# Patient Record
Sex: FEMALE | Race: WHITE | NOT HISPANIC OR LATINO | URBAN - METROPOLITAN AREA
[De-identification: names, ages, dates, MRNs, and addresses within clinical notes are randomized per-mention and may not be internally consistent; named-entity substitution may affect disease eponyms.]

---

## 2023-04-14 DIAGNOSIS — Z12.4 SCREENING FOR CERVICAL CANCER: Primary | ICD-10-CM

## 2024-05-10 ENCOUNTER — OFFICE VISIT (OUTPATIENT)
Dept: OBSTETRICS AND GYNECOLOGY | Facility: CLINIC | Age: 59
End: 2024-05-10
Payer: COMMERCIAL

## 2024-05-10 VITALS
SYSTOLIC BLOOD PRESSURE: 148 MMHG | WEIGHT: 158 LBS | DIASTOLIC BLOOD PRESSURE: 87 MMHG | HEIGHT: 63 IN | BODY MASS INDEX: 28 KG/M2

## 2024-05-10 DIAGNOSIS — Z01.419 ENCOUNTER FOR WELL WOMAN EXAM WITH ROUTINE GYNECOLOGICAL EXAM: Primary | ICD-10-CM

## 2024-05-10 DIAGNOSIS — N95.1 MENOPAUSAL STATE: ICD-10-CM

## 2024-05-10 DIAGNOSIS — Z12.31 BREAST CANCER SCREENING BY MAMMOGRAM: ICD-10-CM

## 2024-05-10 PROCEDURE — 3077F SYST BP >= 140 MM HG: CPT | Mod: CPTII,S$GLB,, | Performed by: OBSTETRICS & GYNECOLOGY

## 2024-05-10 PROCEDURE — 1159F MED LIST DOCD IN RCRD: CPT | Mod: CPTII,S$GLB,, | Performed by: OBSTETRICS & GYNECOLOGY

## 2024-05-10 PROCEDURE — 99459 PELVIC EXAMINATION: CPT | Mod: S$GLB,,, | Performed by: OBSTETRICS & GYNECOLOGY

## 2024-05-10 PROCEDURE — 99386 PREV VISIT NEW AGE 40-64: CPT | Mod: S$GLB,,, | Performed by: OBSTETRICS & GYNECOLOGY

## 2024-05-10 PROCEDURE — 3079F DIAST BP 80-89 MM HG: CPT | Mod: CPTII,S$GLB,, | Performed by: OBSTETRICS & GYNECOLOGY

## 2024-05-10 PROCEDURE — 3008F BODY MASS INDEX DOCD: CPT | Mod: CPTII,S$GLB,, | Performed by: OBSTETRICS & GYNECOLOGY

## 2024-05-10 RX ORDER — LEVOTHYROXINE SODIUM 100 UG/1
TABLET ORAL
COMMUNITY
Start: 2005-02-01

## 2024-05-10 NOTE — PROGRESS NOTES
"CC:  WELL WOMAN (menopausal since )  Patient Care Team:  Austin Gann MD as PCP - General (Internal Medicine)  Darrell Pascal MD (Gastroenterology)    NEW PATIENT       HPI:  Patient is a 58 y.o. who presents for her well woman exam today.  History reviewed with patient.   Patient is without complaints or concerns today.     HRT:  never used systemic hrt  HX ABNL PAPS: leep     REVIEW OF PRIOR DATA/ HEALTH MAINTENANCE:  LAST ANNUAL:    yrs ago with Deya Howard     LAST MMG-  2+yrs   LAST COLONOSCOPY- 2019- by Dr Pascal - q 10 yrs (neg)   LAST DEXA- never     Past Medical History:   Diagnosis Date    Diabetes mellitus     Diverticulitis     Hypothyroidism, unspecified     Metastatic carcinoma     metastatic papillary carcinoma of thyroid-surg then radioactive iodine as she had 8/12 +nodes    Type 2 diabetes mellitus without complications     meds made her feel sick so not on any. Fasting this am was 128    Vaginal tear resulting from childbirth     4th degree     SURGICAL HX:   has a past surgical history that includes Thyroidectomy (); Cholecystectomy (); and Cervical biopsy w/ loop electrode excision ().    SOCIAL HX:    reports that she has never smoked. She has never used smokeless tobacco. She reports that she does not currently use alcohol. She reports that she does not use drugs.    Current Outpatient Medications   Medication Instructions    SYNTHROID 100 mcg tablet        VITALS:  Blood pressure (!) 148/87, height 5' 3" (1.6 m), weight 71.7 kg (158 lb).  Body mass index is 27.99 kg/m².     PHYSICAL EXAM-  APPEARANCE: Well appearing, in no acute distress.   CV/PULM: No resp distress, normal resp effort   PSYCH:  Normal mood and affect, cooperative   SKIN: No rashes, lesions, or abnormal bruising   ABD: Soft, without tenderness or masses.   BREAST: deferred/declined  PELVIC:  VULVA: Normal female genitalia. No lesions.   URETHRAL MEATUS: No masses, no significant " prolapse.   BLADDER/ URETHRA: No masses or suprapubic tenderness   VAGINA/ CVX: No lesions. +atrophic changes. No discharge   UTERUS:  mobile, non-tender   ADNEXA: No masses, tenderness, or fullness   ANUS/ PERINEUM: Normal tone.  No lesions.           *patient verbally consented for exam and female chaperone present for entire exam     ASSESSMENT and PLAN:  Encounter for well woman exam with routine gynecological exam  -     Liquid-based pap smear, screening    Breast cancer screening by mammogram  -     Mammo Digital Screening Bilat w/ Alex; Future; Expected date: 05/24/2024    Menopausal state       FOLLOWUP:  1 year for wwe or sooner prn    COUNSELING:  Patient was counseled today on recommendation for yearly pelvic exam, current Pap guidelines, self breast exams, annual screening mammograms, routine screening colonoscopy , and bone density testing.  Discussed vitamin D and calcium supplements as well as weight bearing exercise to decrease risks. Encouraged patient to see her PCP for other health maintenance.

## 2024-05-15 LAB — Lab: NORMAL

## 2024-05-21 DIAGNOSIS — R92.8 ABNORMAL FINDING ON MAMMOGRAPHY: Primary | ICD-10-CM

## 2024-05-21 NOTE — PROGRESS NOTES
Please let pt know that radiology is requesting some additional views of both breasts in order to further evaluate some areas of assymetry. Sometimes these are just shadows but need to be looked at in another direction to see that. Im placing orders and efaxing so she can call and set that up

## 2024-06-18 ENCOUNTER — TELEPHONE (OUTPATIENT)
Dept: OBSTETRICS AND GYNECOLOGY | Facility: CLINIC | Age: 59
End: 2024-06-18
Payer: COMMERCIAL

## 2024-06-18 NOTE — TELEPHONE ENCOUNTER
Please check with pt and see when she is getting her addtl views of both breasts from her abnl screening mmg in may

## 2024-06-22 ENCOUNTER — PATIENT MESSAGE (OUTPATIENT)
Dept: OBSTETRICS AND GYNECOLOGY | Facility: CLINIC | Age: 59
End: 2024-06-22
Payer: COMMERCIAL

## 2024-07-05 ENCOUNTER — DOCUMENTATION ONLY (OUTPATIENT)
Dept: OBSTETRICS AND GYNECOLOGY | Facility: CLINIC | Age: 59
End: 2024-07-05
Payer: COMMERCIAL

## 2024-08-22 ENCOUNTER — TELEPHONE (OUTPATIENT)
Dept: OBSTETRICS AND GYNECOLOGY | Facility: CLINIC | Age: 59
End: 2024-08-22
Payer: COMMERCIAL

## 2024-08-22 ENCOUNTER — PATIENT MESSAGE (OUTPATIENT)
Dept: OBSTETRICS AND GYNECOLOGY | Facility: CLINIC | Age: 59
End: 2024-08-22
Payer: COMMERCIAL

## 2024-08-22 NOTE — TELEPHONE ENCOUNTER
Please check with pt and make sure she has decided NOT to get her additional mmg views due to cost. ($500+ quoted as her portion in June).  They were trying to look at an area of asymmetry so the additional views are really what they need- an ultrasound will not be useful. They basically need to look with mmg from different angles to see if it is still there. If she has met more of her deductible it is possible it may be less expensive.  However, if we can find her the [employer] billing contact number, it would be worth her checking cash price and I know they will do payment plans. The other option is give her the breast nurse navigator number as they have programs there at the breast center for reduced cost/free imaging as well she may qualify for.

## 2025-05-22 NOTE — PROGRESS NOTES
"CC:  WELL WOMAN (menopausal since )  Patient Care Team:  Austin Gann MD as PCP - General (Internal Medicine)  Darrell Pascal MD (Gastroenterology)        HPI:  Patient is a 59 y.o. who presents for her well woman exam today.  History reviewed with patient. Pt had a birads 0 mmg in may 2024 and did not feels she needed the additional views of both breasts and wasn't going to pay the $500 for them. Still doesn't feel she needs the addtl views and want to do only the screening mmg this year. Discussed that mmgs detect breast cancer that is too small to be felt. Pt states that " the person who read the mmg needs to look harder at the screening mmg" and not charge her $500 to look again. Advised the asymmetric areas often need additional views from a different direction to see if there is something there or if the resolve from looking a different direction at that area.   Patient is without complaints or concerns today.     HRT- none  HX ABNL PAPS: hx LEEP    REVIEW OF PRIOR DATA/ HEALTH MAINTENANCE:  LAST ANNUAL:   May 10 2024    LAST MMG- May 2024-  [facility]-birads 0-pt declined bc too $$   LAST COLONOSCOPY- - by Dr Pascal - q 10 yrs (neg)        Past Medical History:   Diagnosis Date    Diabetes mellitus     Diverticulitis     Hypothyroidism, unspecified     Metastatic carcinoma     metastatic papillary carcinoma of thyroid-surg then radioactive iodine as she had 8/12 +nodes    Type 2 diabetes mellitus without complications     meds made her feel sick so not on any. Fasting this am was 128    Vaginal tear resulting from childbirth     4th degree     SURGICAL HX:   has a past surgical history that includes Thyroidectomy (); Cholecystectomy (); and Cervical biopsy w/ loop electrode excision ().    SOCIAL HX:    reports that she has never smoked. She has never used smokeless tobacco. She reports that she does not currently use alcohol. She reports that she does not use " drugs.    Current Outpatient Medications   Medication Instructions    cetirizine (ZYRTEC) 10 mg, Daily    SYNTHROID 100 mcg tablet      VITALS:  Blood pressure (!) 155/102, weight 71.9 kg (158 lb 9.6 oz).  Body mass index is 28.09 kg/m².     PHYSICAL EXAM-  APPEARANCE: Well appearing, in no acute distress.   CV/PULM: No resp distress, normal resp effort   PSYCH:  Normal mood and affect, cooperative   SKIN: No rashes, lesions, or abnormal bruising   ABD: Soft, without tenderness or masses.   BREAST: deferred/declined  PELVIC:  VULVA: Normal female genitalia. No lesions.   URETHRAL MEATUS: No masses, no significant prolapse.   BLADDER/ URETHRA: No masses or suprapubic tenderness   VAGINA/ CVX: No lesions. +atrophic changes. No discharge   UTERUS:  mobile, non-tender   ADNEXA: No masses, tenderness, or fullness   ANUS/ PERINEUM: Normal tone.  No lesions.           *patient verbally consented for exam and female chaperone present for entire exam     ASSESSMENT and PLAN:  Encounter for well woman exam with routine gynecological exam  -     Liquid-based pap smear, screening    Breast cancer screening by mammogram  -     Mammo Digital Screening Bilat w/ Alex ([facility]); Future; Expected date: 06/05/2025       FOLLOWUP:  1 year for wwe or sooner prn    COUNSELING:  Patient was counseled today on recommendation for yearly pelvic exam, current Pap guidelines, self breast exams, annual screening mammograms, routine screening colonoscopy , and bone density testing.  Discussed vitamin D and calcium supplements as well as weight bearing exercise to decrease risks. Encouraged patient to see her PCP for other health maintenance.

## 2025-05-27 ENCOUNTER — OFFICE VISIT (OUTPATIENT)
Dept: OBSTETRICS AND GYNECOLOGY | Facility: CLINIC | Age: 60
End: 2025-05-27
Payer: COMMERCIAL

## 2025-05-27 VITALS
WEIGHT: 158.63 LBS | BODY MASS INDEX: 28.09 KG/M2 | SYSTOLIC BLOOD PRESSURE: 155 MMHG | DIASTOLIC BLOOD PRESSURE: 102 MMHG

## 2025-05-27 DIAGNOSIS — Z12.31 BREAST CANCER SCREENING BY MAMMOGRAM: ICD-10-CM

## 2025-05-27 DIAGNOSIS — Z01.419 ENCOUNTER FOR WELL WOMAN EXAM WITH ROUTINE GYNECOLOGICAL EXAM: Primary | ICD-10-CM

## 2025-05-27 PROCEDURE — 3080F DIAST BP >= 90 MM HG: CPT | Mod: CPTII,,, | Performed by: OBSTETRICS & GYNECOLOGY

## 2025-05-27 PROCEDURE — 3008F BODY MASS INDEX DOCD: CPT | Mod: CPTII,,, | Performed by: OBSTETRICS & GYNECOLOGY

## 2025-05-27 PROCEDURE — 99396 PREV VISIT EST AGE 40-64: CPT | Mod: S$PBB,,, | Performed by: OBSTETRICS & GYNECOLOGY

## 2025-05-27 PROCEDURE — 3077F SYST BP >= 140 MM HG: CPT | Mod: CPTII,,, | Performed by: OBSTETRICS & GYNECOLOGY

## 2025-05-27 PROCEDURE — 1159F MED LIST DOCD IN RCRD: CPT | Mod: CPTII,,, | Performed by: OBSTETRICS & GYNECOLOGY

## 2025-05-27 RX ORDER — CETIRIZINE HYDROCHLORIDE 10 MG/1
10 TABLET ORAL DAILY
COMMUNITY

## 2025-05-28 ENCOUNTER — RESULTS FOLLOW-UP (OUTPATIENT)
Dept: OBSTETRICS AND GYNECOLOGY | Facility: CLINIC | Age: 60
End: 2025-05-28

## 2025-05-28 LAB — Lab: NORMAL

## 2025-07-09 ENCOUNTER — TELEPHONE (OUTPATIENT)
Dept: OBSTETRICS AND GYNECOLOGY | Facility: CLINIC | Age: 60
End: 2025-07-09
Payer: COMMERCIAL

## 2025-07-09 NOTE — TELEPHONE ENCOUNTER
Returned call to Yuri, Nurse Manager-Radiology. She reports patient needed a diagnostic mammogram because of an abnormal mammogram last year that was not followed up on. She states patient refused to have a diagnostic mammogram done due to finances. Yuri has started applying for patient assistance for the patient through the [facility]. She is confident the patient will qualify, and will reach back out to patient to have the procedure done.